# Patient Record
Sex: FEMALE | Race: WHITE | URBAN - NONMETROPOLITAN AREA
[De-identification: names, ages, dates, MRNs, and addresses within clinical notes are randomized per-mention and may not be internally consistent; named-entity substitution may affect disease eponyms.]

---

## 2021-07-16 ENCOUNTER — IMPORTED ENCOUNTER (OUTPATIENT)
Dept: URBAN - NONMETROPOLITAN AREA CLINIC 1 | Facility: CLINIC | Age: 51
End: 2021-07-16

## 2021-07-16 PROBLEM — H52.03: Noted: 2021-07-16

## 2021-07-16 PROBLEM — H52.223: Noted: 2021-07-16

## 2021-07-16 PROBLEM — H52.4: Noted: 2021-07-16

## 2021-07-16 PROBLEM — H53.19: Noted: 2021-07-16

## 2021-07-16 PROCEDURE — S0620 ROUTINE OPHTHALMOLOGICAL EXA: HCPCS

## 2021-07-16 NOTE — PATIENT DISCUSSION
Compound Hyperopic Astigmatism OU w/Presbyopia-  discussed findings w/patient-  new spectacle Rx issued-  continue to monitor yearly or prnVisual Disturbance OU- discussed findings w/patient-  discussed the possibility of ophthalmic migraine-  refer to [facility] for further eval; 's Notes:  7/16/2021DFE 7/16/2021

## 2022-04-15 ASSESSMENT — VISUAL ACUITY
OS_SC: 20/20
OU_CC: 20/20
OD_SC: 20/30
OU_SC: 20/20

## 2022-04-15 ASSESSMENT — TONOMETRY
OS_IOP_MMHG: 16
OD_IOP_MMHG: 16